# Patient Record
Sex: FEMALE | Race: OTHER
[De-identification: names, ages, dates, MRNs, and addresses within clinical notes are randomized per-mention and may not be internally consistent; named-entity substitution may affect disease eponyms.]

---

## 2018-07-23 ENCOUNTER — HOSPITAL ENCOUNTER (EMERGENCY)
Dept: HOSPITAL 62 - ER | Age: 18
Discharge: HOME | End: 2018-07-23
Payer: SELF-PAY

## 2018-07-23 VITALS — SYSTOLIC BLOOD PRESSURE: 107 MMHG | DIASTOLIC BLOOD PRESSURE: 57 MMHG
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
## 2018-07-23 DIAGNOSIS — S92.424B: Primary | ICD-10-CM

## 2018-07-23 PROCEDURE — 12001 RPR S/N/AX/GEN/TRNK 2.5CM/<: CPT

## 2018-07-23 PROCEDURE — 99283 EMERGENCY DEPT VISIT LOW MDM: CPT

## 2018-07-23 PROCEDURE — 73660 X-RAY EXAM OF TOE(S): CPT

## 2018-07-23 NOTE — ER DOCUMENT REPORT
ED Extremity Problem, Lower





- General


Chief Complaint: Toe Injury


Stated Complaint: TOE INJURY


Time Seen by Provider: 07/23/18 19:21


Mode of Arrival: Ambulatory


Information source: Patient


Notes: 





Patient is a 18-year-old female who presents to the ER today for injury to the 

right great toe after out of our last night having heavy gait fall on it.  

Patient states that she was drunk so she went home and went to sleep.  This 

happened approximately 3 AM last night.  She admits to pain to the toe, denies 

any numbness or tingling.


TRAVEL OUTSIDE OF THE [place] IN LAST 30 DAYS: No





Past Medical History





- General


Information source: Patient





- Social History


Smoking Status: Unknown if Ever Smoked


Family History: Reviewed & Not Pertinent


Patient has suicidal ideation: No


Patient has homicidal ideation: No


Renal/ Medical History: Denies: Hx Peritoneal Dialysis





Review of Systems





- Review of Systems


Constitutional: No symptoms reported


EENT: No symptoms reported


Cardiovascular: No symptoms reported


Respiratory: No symptoms reported


Gastrointestinal: No symptoms reported


Genitourinary: No symptoms reported


Female Genitourinary: No symptoms reported


Musculoskeletal: See HPI


Skin: See HPI


Hematologic/Lymphatic: No symptoms reported


Neurological/Psychological: No symptoms reported





Physical Exam





- Vital signs


Vitals: 


 











Temp Pulse Resp BP Pulse Ox


 


 98.8 F   73   18   107/57 L  100 


 


 07/23/18 18:26  07/23/18 18:26  07/23/18 18:26  07/23/18 18:26  07/23/18 18:26














- Notes


Notes: 





PHYSICAL EXAMINATION: 


GENERAL: Well-appearing and in no acute distress. 


HEAD: Atraumatic, normocephalic. 


EYES: Pupils equal round and reactive to light, extraocular movements intact, 

sclera anicteric, conjunctiva are normal. 


NECK: Normal range of motion, supple without lymphadenopathy 


LUNGS: CTAB and equal. No wheezes rales or rhonchi. 


HEART: Regular rate and rhythm without murmurs


EXTREMITIES: Normal range of motion, no pitting edema. No cyanosis. 


NEUROLOGICAL: Cranial nerves grossly intact. Normal sensory/motor exams. 


PSYCH: Normal mood, normal affect. 


SKIN: Warm, Dry, normal turgor, laceration to the base of the right great toe, 

nail completely out of nailbed, skin around base of toe missing, partial nail 

missing, no active bleeding

















Course





- Re-evaluation


Re-evalutation: 





07/23/18 23:12


Patient has a distal fracture of the phalanx, nondisplaced, it is an open 

fracture, nailbed was attempted to be sutured down, however attempts failed as 

there is really no scanned but the nailbed back under.  I did have my attending 

come take a look at it who advises Dermabond at this time and follow-up with 

podiatry.  One suture was placed.





- Vital Signs


Vital signs: 


 











Temp Pulse Resp BP Pulse Ox


 


 98.8 F   73   18   107/57 L  100 


 


 07/23/18 18:26  07/23/18 18:26  07/23/18 18:26  07/23/18 18:26  07/23/18 18:26














Procedures





- Additional Procedures


  ** digital block


Time performed: 22:00 - Lidocaine 1% without epi, 5 cc used to digitally block 

great toe of the right foot





Discharge





- Discharge


Clinical Impression: 


Nailbed laceration, toe


Qualifiers:


 Encounter type: initial encounter Qualified Code(s): S91.219A - Laceration 

without foreign body of unspecified toe(s) with damage to nail, initial 

encounter





Toe fracture, right


Qualifiers:


 Encounter type: initial encounter Toe: great toe Fracture type: open Phalanx: 

distal Fracture alignment: nondisplaced Qualified Code(s): S92.424B - 

Nondisplaced fracture of distal phalanx of right great toe, initial encounter 

for open fracture





Condition: Stable


Disposition: HOME, SELF-CARE


Additional Instructions: 


Please follow-up with podiatry.  Return immediately for any new or worsening 

symptoms.





Follow up with primary care provider, call tomorrow to make followup 

appointment.


Prescriptions: 


Amox Tr/Potassium Clavulanate [Augmentin 875-125 Tablet] 1 tab PO BID 10 Days  

tablet


Hydrocodone/Acetaminophen [Norco 5-325 mg Tablet] 1 tab PO Q4 PRN #15 tablet


 PRN Reason: 


Forms:  Return to Work


Referrals: 


CARINA JJ DPM [ACTIVE STAFF] - Follow up as needed


LAURIE THAPA MD [ACTIVE STAFF] - Follow up as needed

## 2018-07-23 NOTE — RADIOLOGY REPORT (SQ)
EXAM DESCRIPTION:  TOE RIGHT



COMPLETED DATE/TIME:  7/23/2018 8:07 pm



REASON FOR STUDY:  toe injury, gate fell on



COMPARISON:  None.



NUMBER OF VIEWS:  Three views.



TECHNIQUE:  AP, lateral, and oblique images acquired of the right first toe.



LIMITATIONS:  None.



FINDINGS:  MINERALIZATION: Normal.

BONES: Nondisplaced transverse fracture of the distal phalanx.

JOINTS: No effusions.

SOFT TISSUES: No soft tissue swelling.  No foreign body.

OTHER: No other significant finding.



IMPRESSION:  NONDISPLACED TRANSVERSE FRACTURE OF THE DISTAL PHALANX OF THE 1ST TOE.



COMMENT:  SITE OF TRAUMA/COMPLAINT MARKED/STAMP COMPLETED: YES.



TECHNICAL DOCUMENTATION:  JOB ID:  [id number]

 2011 [employer]- All Rights Reserved



Reading location - IP/workstation name: [email or web address]